# Patient Record
Sex: FEMALE | Race: WHITE
[De-identification: names, ages, dates, MRNs, and addresses within clinical notes are randomized per-mention and may not be internally consistent; named-entity substitution may affect disease eponyms.]

---
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
## 2020-04-24 ENCOUNTER — HOSPITAL ENCOUNTER (EMERGENCY)
Dept: HOSPITAL 47 - EC | Age: 20
LOS: 1 days | Discharge: HOME | End: 2020-04-25
Payer: COMMERCIAL

## 2020-04-24 VITALS — TEMPERATURE: 98.7 F

## 2020-04-24 VITALS — HEART RATE: 72 BPM | SYSTOLIC BLOOD PRESSURE: 113 MMHG | DIASTOLIC BLOOD PRESSURE: 63 MMHG

## 2020-04-24 VITALS — RESPIRATION RATE: 18 BRPM

## 2020-04-24 DIAGNOSIS — G40.909: ICD-10-CM

## 2020-04-24 DIAGNOSIS — Z79.899: ICD-10-CM

## 2020-04-24 DIAGNOSIS — N83.201: Primary | ICD-10-CM

## 2020-04-24 LAB
ALBUMIN SERPL-MCNC: 4.3 G/DL (ref 3.5–5)
ALP SERPL-CCNC: 65 U/L (ref 38–126)
ALT SERPL-CCNC: 27 U/L (ref 4–34)
ANION GAP SERPL CALC-SCNC: 10 MMOL/L
APTT BLD: 20 SEC (ref 22–30)
AST SERPL-CCNC: 23 U/L (ref 14–36)
BASOPHILS # BLD AUTO: 0 K/UL (ref 0–0.2)
BASOPHILS NFR BLD AUTO: 0 %
BUN SERPL-SCNC: 15 MG/DL (ref 7–17)
CALCIUM SPEC-MCNC: 9.2 MG/DL (ref 8.4–10.2)
CHLORIDE SERPL-SCNC: 103 MMOL/L (ref 98–107)
CO2 SERPL-SCNC: 20 MMOL/L (ref 22–30)
EOSINOPHIL # BLD AUTO: 0.1 K/UL (ref 0–0.7)
EOSINOPHIL NFR BLD AUTO: 0 %
ERYTHROCYTE [DISTWIDTH] IN BLOOD BY AUTOMATED COUNT: 3.6 M/UL (ref 3.8–5.4)
ERYTHROCYTE [DISTWIDTH] IN BLOOD: 13.1 % (ref 11.5–15.5)
GLUCOSE SERPL-MCNC: 97 MG/DL (ref 74–99)
HCT VFR BLD AUTO: 32.8 % (ref 34–46)
HGB BLD-MCNC: 10.6 GM/DL (ref 11.4–16)
INR PPP: 1 (ref ?–1.2)
LYMPHOCYTES # SPEC AUTO: 1 K/UL (ref 1–4.8)
LYMPHOCYTES NFR SPEC AUTO: 6 %
MAGNESIUM SPEC-SCNC: 1.7 MG/DL (ref 1.6–2.3)
MCH RBC QN AUTO: 29.4 PG (ref 25–35)
MCHC RBC AUTO-ENTMCNC: 32.2 G/DL (ref 31–37)
MCV RBC AUTO: 91.1 FL (ref 80–100)
MONOCYTES # BLD AUTO: 0.8 K/UL (ref 0–1)
MONOCYTES NFR BLD AUTO: 4 %
NEUTROPHILS # BLD AUTO: 15.9 K/UL (ref 1.3–7.7)
NEUTROPHILS NFR BLD AUTO: 89 %
PLATELET # BLD AUTO: 340 K/UL (ref 150–450)
POTASSIUM SERPL-SCNC: 4.2 MMOL/L (ref 3.5–5.1)
PROT SERPL-MCNC: 6.8 G/DL (ref 6.3–8.2)
PT BLD: 10.7 SEC (ref 9–12)
SODIUM SERPL-SCNC: 133 MMOL/L (ref 137–145)
WBC # BLD AUTO: 17.8 K/UL (ref 4–11)

## 2020-04-24 PROCEDURE — 85025 COMPLETE CBC W/AUTO DIFF WBC: CPT

## 2020-04-24 PROCEDURE — 96376 TX/PRO/DX INJ SAME DRUG ADON: CPT

## 2020-04-24 PROCEDURE — 76830 TRANSVAGINAL US NON-OB: CPT

## 2020-04-24 PROCEDURE — 86900 BLOOD TYPING SEROLOGIC ABO: CPT

## 2020-04-24 PROCEDURE — 85610 PROTHROMBIN TIME: CPT

## 2020-04-24 PROCEDURE — 86901 BLOOD TYPING SEROLOGIC RH(D): CPT

## 2020-04-24 PROCEDURE — 96374 THER/PROPH/DIAG INJ IV PUSH: CPT

## 2020-04-24 PROCEDURE — 36415 COLL VENOUS BLD VENIPUNCTURE: CPT

## 2020-04-24 PROCEDURE — 83735 ASSAY OF MAGNESIUM: CPT

## 2020-04-24 PROCEDURE — 93975 VASCULAR STUDY: CPT

## 2020-04-24 PROCEDURE — 86850 RBC ANTIBODY SCREEN: CPT

## 2020-04-24 PROCEDURE — 85730 THROMBOPLASTIN TIME PARTIAL: CPT

## 2020-04-24 PROCEDURE — 80053 COMPREHEN METABOLIC PANEL: CPT

## 2020-04-24 PROCEDURE — 96375 TX/PRO/DX INJ NEW DRUG ADDON: CPT

## 2020-04-24 PROCEDURE — 96361 HYDRATE IV INFUSION ADD-ON: CPT

## 2020-04-24 PROCEDURE — 99285 EMERGENCY DEPT VISIT HI MDM: CPT

## 2020-04-24 NOTE — US
EXAMINATION TYPE: US transvaginal

 

DATE OF EXAM: 4/24/2020

 

COMPARISON: NONE

 

CLINICAL HISTORY: abd pain. Right side pain. 

 

TECHNIQUE:  Transabdominal (TA).  

 

Date of LMP:  3/28/2020, G0

 

EXAM MEASUREMENTS:

 

Uterus:  6.5 x 3.7 x 3.1 cm

Endometrial Stripe: 1.0 cm

Right Ovary:  7.7 x 5.6 x 4.7 cm

Left Ovary:  2.7 x 1.4 x 1.7 cm

 

 

 

1. Uterus:  Anteverted   wnl

2. Endometrium:  wnl

3. Right Ovary:  Enlarged with mixed lesion containing peripheral vascular flow = 5.6 x 4.7 x 3.7 cm 


4. Left Ovary:  follicles seen

**Spectral, color and waveform doppler imaging shows good arterial and venous flow within the ovaries
; there is no evidence for ovarian torsion.

5. Bilateral Adnexa:  free fluid seen adjacent to right ovary with internal debris

6. Posterior cul-de-sac:  Complex free fluid seen.

Cervix- wnl

 

IMPRESSION: The combination of findings of an enlarged right ovary and complex free fluid are concern
ing for ovarian torsion despite the vascular flow to the right ovary in this patient with right-sided
 abdominal pain. Given the complex free fluid confirmation of a negative serum beta hCG is necessary 
to exclude ectopic pregnancy/ruptured ectopic pregnancy. OB/GYN consultation is recommended.

## 2020-04-24 NOTE — ED
Recheck HPI





- General


Stated Complaint: Abd Pain


Time Seen by Provider: 04/24/20 18:47


Source: RN notes reviewed, old records reviewed


Mode of arrival: EMS


Limitations: no limitations





- History of Present Illness


Initial Comments: 





This is a 19-year-old female DF for evaluation patient Dese for evaluation 

regards to severe abdominal pain, patient was accepted as a transfer to this 

Hospital from outside facility with expectations of needing to have surgery.  

Family presents with patient and these expectations they did state that they 

were expected them urgent OB gynecological consult.  Patient's pain is 

uncontrolled.  No active bleeding


MD Complaint: other (Abnormal findings on computed tomography scan at another 

facility sent to ER for further evaluation here)


-: hour(s)





- Related Data


                                Home Medications











 Medication  Instructions  Recorded  Confirmed


 


Norethindrone-E.estradiol-Iron 1 tab PO DAILY 04/24/20 04/24/20





[Microgestin Fe 1-20 Tablet]   


 


lamoTRIgine 200 mg PO DAILY 04/24/20 04/24/20











                                    Allergies











Allergy/AdvReac Type Severity Reaction Status Date / Time


 


No Known Allergies Allergy   Verified 04/24/20 19:12














Review of Systems


ROS Statement: 


Those systems with pertinent positive or pertinent negative responses have been 

documented in the HPI.





ROS Other: All systems not noted in ROS Statement are negative.





Past Medical History


Past Medical History: Seizure Disorder


History of Any Multi-Drug Resistant Organisms: None Reported


Past Surgical History: No Surgical Hx Reported


Past Psychological History: No Psychological Hx Reported


Smoking Status: Never smoker


Past Alcohol Use History: Occasional


Past Drug Use History: None Reported





General Exam


General appearance: alert, in no apparent distress, anxious


Head exam: Present: atraumatic, normocephalic, normal inspection


Eye exam: Present: normal appearance, PERRL, EOMI.  Absent: scleral icterus, 

conjunctival injection, periorbital swelling


ENT exam: Present: normal exam, mucous membranes moist


Neck exam: Present: normal inspection.  Absent: tenderness, meningismus, 

lymphadenopathy


Respiratory exam: Present: normal lung sounds bilaterally.  Absent: respiratory 

distress, wheezes, rales, rhonchi, stridor


Cardiovascular Exam: Present: regular rate, normal rhythm, normal heart sounds. 

Absent: systolic murmur, diastolic murmur, rubs, gallop, clicks


GI/Abdominal exam: Present: soft, tenderness, guarding (Voluntarily diffusely), 

normal bowel sounds.  Absent: distended, rebound, rigid


Extremities exam: Present: normal inspection, full ROM, normal capillary refill.

 Absent: tenderness, pedal edema, joint swelling, calf tenderness


Back exam: Present: normal inspection


Neurological exam: Present: alert, oriented X3, CN II-XII intact


Psychiatric exam: Present: normal affect, normal mood


Skin exam: Present: warm, dry, intact, normal color.  Absent: rash





Course


                                   Vital Signs











  04/24/20 04/24/20 04/24/20





  18:48 21:10 23:09


 


Temperature 100 F H 98.7 F 


 


Pulse Rate 75 84 72


 


Respiratory 18 18 18





Rate   


 


Blood Pressure 124/62 111/90 113/63


 


O2 Sat by Pulse 98 100 97





Oximetry   














- Reevaluation(s)


Reevaluation #1: 





04/25/20 00:09


Medical record is reviewed including transferring paperwork


Reevaluation #2: 





04/25/20 00:09


Did speak to transferring physician and agreed to accept transfer


Reevaluation #3: 





04/25/20 00:09


Dr. Sanders  was made aware of patient's arrival in the emergency department the 

patient was in the emergency department, she was in the hospital delivered a 

baby, she did come down and evaluate patient upon patient arrival





Speaking with Dr. Sanders and patient's findings decision for emergent surgery 

wasn't deemed unnecessary and patient can follow up on an outpatient basis


Reevaluation #4: 





04/25/20 00:10


From the very first evaluation including physical exam patient became very 

irritated sitting that she could not breathe and yelling at me not to further 

examine her





Family was very irritated upon arrival to Hospital as they were expecting having

surgery today, unhappy with change of plan, Dr. Sanders did evaluate patient here

in the ER as well as myself, family subsequently did not know patient was on 

seizure medications, patient's pain was intractable here in the ER requiring 

multiple doses of pain medication





Family and patient given multiple options including ability to stay in the 

hospital of his symptoms therapy, they did refuse this option





Medical Decision Making





- Medical Decision Making





19 female from for ovarian cyst no torsion no excessive blood loss hemoglobin is

stable, signs are remaining stable pain although difficult to control is now 

controlled





- Lab Data


Result diagrams: 


                                 04/24/20 19:47





                                 04/24/20 19:47


                                   Lab Results











  04/24/20 04/24/20 04/24/20 Range/Units





  19:47 19:47 19:47 


 


WBC  17.8 H    (4.0-11.0)  k/uL


 


RBC  3.60 L    (3.80-5.40)  m/uL


 


Hgb  10.6 L    (11.4-16.0)  gm/dL


 


Hct  32.8 L    (34.0-46.0)  %


 


MCV  91.1    (80.0-100.0)  fL


 


MCH  29.4    (25.0-35.0)  pg


 


MCHC  32.2    (31.0-37.0)  g/dL


 


RDW  13.1    (11.5-15.5)  %


 


Plt Count  340    (150-450)  k/uL


 


Neutrophils %  89    %


 


Lymphocytes %  6    %


 


Monocytes %  4    %


 


Eosinophils %  0    %


 


Basophils %  0    %


 


Neutrophils #  15.9 H    (1.3-7.7)  k/uL


 


Lymphocytes #  1.0    (1.0-4.8)  k/uL


 


Monocytes #  0.8    (0-1.0)  k/uL


 


Eosinophils #  0.1    (0-0.7)  k/uL


 


Basophils #  0.0    (0-0.2)  k/uL


 


PT   10.7   (9.0-12.0)  sec


 


INR   1.0   (<1.2)  


 


APTT   20.0 L   (22.0-30.0)  sec


 


Sodium    133 L  (137-145)  mmol/L


 


Potassium    4.2  (3.5-5.1)  mmol/L


 


Chloride    103  ()  mmol/L


 


Carbon Dioxide    20 L  (22-30)  mmol/L


 


Anion Gap    10  mmol/L


 


BUN    15  (7-17)  mg/dL


 


Creatinine    0.77  (0.52-1.04)  mg/dL


 


Est GFR (CKD-EPI)AfAm    >90  (>60 ml/min/1.73 sqM)  


 


Est GFR (CKD-EPI)NonAf    >90  (>60 ml/min/1.73 sqM)  


 


Glucose    97  (74-99)  mg/dL


 


Calcium    9.2  (8.4-10.2)  mg/dL


 


Magnesium    1.7  (1.6-2.3)  mg/dL


 


Total Bilirubin    0.6  (0.2-1.3)  mg/dL


 


AST    23  (14-36)  U/L


 


ALT    27  (4-34)  U/L


 


Alkaline Phosphatase    65  ()  U/L


 


Total Protein    6.8  (6.3-8.2)  g/dL


 


Albumin    4.3  (3.5-5.0)  g/dL


 


Blood Type     


 


Blood Type Confirm     


 


Blood Type Recheck     


 


Bld Type Recheck Status     


 


Antibody Screen     


 


Spec Expiration Date     














  04/24/20 04/24/20 Range/Units





  19:47 20:54 


 


WBC    (4.0-11.0)  k/uL


 


RBC    (3.80-5.40)  m/uL


 


Hgb    (11.4-16.0)  gm/dL


 


Hct    (34.0-46.0)  %


 


MCV    (80.0-100.0)  fL


 


MCH    (25.0-35.0)  pg


 


MCHC    (31.0-37.0)  g/dL


 


RDW    (11.5-15.5)  %


 


Plt Count    (150-450)  k/uL


 


Neutrophils %    %


 


Lymphocytes %    %


 


Monocytes %    %


 


Eosinophils %    %


 


Basophils %    %


 


Neutrophils #    (1.3-7.7)  k/uL


 


Lymphocytes #    (1.0-4.8)  k/uL


 


Monocytes #    (0-1.0)  k/uL


 


Eosinophils #    (0-0.7)  k/uL


 


Basophils #    (0-0.2)  k/uL


 


PT    (9.0-12.0)  sec


 


INR    (<1.2)  


 


APTT    (22.0-30.0)  sec


 


Sodium    (137-145)  mmol/L


 


Potassium    (3.5-5.1)  mmol/L


 


Chloride    ()  mmol/L


 


Carbon Dioxide    (22-30)  mmol/L


 


Anion Gap    mmol/L


 


BUN    (7-17)  mg/dL


 


Creatinine    (0.52-1.04)  mg/dL


 


Est GFR (CKD-EPI)AfAm    (>60 ml/min/1.73 sqM)  


 


Est GFR (CKD-EPI)NonAf    (>60 ml/min/1.73 sqM)  


 


Glucose    (74-99)  mg/dL


 


Calcium    (8.4-10.2)  mg/dL


 


Magnesium    (1.6-2.3)  mg/dL


 


Total Bilirubin    (0.2-1.3)  mg/dL


 


AST    (14-36)  U/L


 


ALT    (4-34)  U/L


 


Alkaline Phosphatase    ()  U/L


 


Total Protein    (6.3-8.2)  g/dL


 


Albumin    (3.5-5.0)  g/dL


 


Blood Type  A Positive   


 


Blood Type Confirm   A Positive  


 


Blood Type Recheck  No Previous Record   


 


Bld Type Recheck Status  CABO Indicated   


 


Antibody Screen  NEGATIVE   


 


Spec Expiration Date  04/27/2020 - 2348   














- Radiology Data


Radiology results: report reviewed (Ultrasound shows no evidence of torsion), 

image reviewed





Disposition


Clinical Impression: 


 Abdominal pain, Ovarian cyst rupture





Disposition: HOME SELF-CARE


Condition: Good


Instructions (If sedation given, give patient instructions):  Ruptured Ovarian 

Cyst (ED)


Is patient prescribed a controlled substance at d/c from ED?: No


Referrals: 


Zeina Sanders DO [Doctor of Osteopathic Medicine] - 1-2 days


Heather Plascencia MD [STAFF PHYSICIAN] - 1-2 days

## 2020-10-30 ENCOUNTER — HOSPITAL ENCOUNTER (OUTPATIENT)
Dept: HOSPITAL 47 - LABWHC1 | Age: 20
Discharge: HOME | End: 2020-10-30
Attending: FAMILY MEDICINE
Payer: COMMERCIAL

## 2020-10-30 DIAGNOSIS — Z20.828: ICD-10-CM

## 2020-10-30 DIAGNOSIS — R50.9: Primary | ICD-10-CM

## 2020-11-16 ENCOUNTER — HOSPITAL ENCOUNTER (OUTPATIENT)
Dept: HOSPITAL 47 - LABWHC1 | Age: 20
Discharge: HOME | End: 2020-11-16
Attending: FAMILY MEDICINE
Payer: COMMERCIAL

## 2020-11-16 DIAGNOSIS — Z20.828: Primary | ICD-10-CM

## 2021-01-07 ENCOUNTER — HOSPITAL ENCOUNTER (OUTPATIENT)
Dept: HOSPITAL 47 - RADXRYALE | Age: 21
Discharge: HOME | End: 2021-01-07
Attending: PHYSICIAN ASSISTANT
Payer: COMMERCIAL

## 2021-01-07 DIAGNOSIS — M79.645: Primary | ICD-10-CM

## 2021-01-07 NOTE — XR
EXAMINATION TYPE: XR finger LT

 

DATE OF EXAM: 1/7/2021

 

COMPARISON: None

 

HISTORY: Left finger pain

 

TECHNIQUE: 2 view left ring finger

 

FINDINGS: No acute fractures or dislocations are evident. Joint spaces are preserved. Soft tissues ap
pear normal.

 

Consider follow-up exam in 7-10 days from acute trauma for pain.

 

IMPRESSION:

1.  No acute osseous abnormality left ring finger.